# Patient Record
(demographics unavailable — no encounter records)

---

## 2025-04-09 NOTE — ASSESSMENT
[FreeTextEntry1] : We reviewed routine infectious criterion for tonsillectomy. We are normally looking for 7 episodes in one year, 5 episodes in two consecutive years, or 3 episodes in each of three years (known as the [place] criteria). We do sometimes modify these if there is overlap with PHILIPPE or a syndromic presentation. The patient has not yet met these criteria, but they will be precise with event documentation in the future. Return to discuss possible tonsillectomy if recurrent strep infections.   Positive depression screening today. Denies wanting to hurt self or others. Resources provided. Reached out to primary PCP Dr. Bradley to inform her of positive screen today.

## 2025-04-09 NOTE — REVIEW OF SYSTEMS
[Negative] : Heme/Lymph [de-identified] : as per HPI [de-identified] : as per HPI [de-identified] : as per HPI

## 2025-04-09 NOTE — REASON FOR VISIT
[Initial Evaluation] : an initial evaluation for [Throat Infections] : throat infections [Patient] : patient [Mother] : mother

## 2025-04-09 NOTE — PHYSICAL EXAM
[2+] : 2+ [Normal Gait and Station] : normal gait and station [Normal muscle strength, symmetry and tone of facial, head and neck musculature] : normal muscle strength, symmetry and tone of facial, head and neck musculature [Normal] : no cervical lymphadenopathy [Exposed Vessel] : left anterior vessel not exposed [Increased Work of Breathing] : no increased work of breathing with use of accessory muscles and retractions [de-identified] : no oral ulcers

## 2025-04-09 NOTE — HISTORY OF PRESENT ILLNESS
[No Personal or Family History of Easy Bruising, Bleeding, or Issues with General Anesthesia] : No Personal or Family History of easy bruising, bleeding, or issues with general anesthesia [de-identified] : Sarmad is a 15 year old boy here for evaluation of possible tonsil ulcer and recurrent strep throat infections.  Here with mom who helps provide history.   Recently with strep throat - evaluated at urgent care.  Was having throat pain and difficulty swallowing.  Noted to have small ulcer on tonsil and referred for ENT evaluation.  Strep resolved with antibiotics. No longer painful and no longer ulcer.  Does get frequent strep throat infections over the past couple of years.  Mom unsure exact number - always going to urgent care.  At least 4 infections this year. Likely 4 in the previous year.  Sibling with history of tonsillectomy for recurrent strep.   Seasonal allergies.  Uses antihistamines during the spring.  Not using nasal sprays.  Not evaluated by an allergist recently.   Screened positive on depression screening today.  Endorses anxiety recently and recent anxiety attack during sports.  Open to talking to someone.  Denies thoughts of hurting self or others.   No hearing or speech concerns.  No other otolaryngology concerns.

## 2025-04-09 NOTE — CONSULT LETTER
[Dear  ___] : Dear  [unfilled], [Courtesy Letter:] : I had the pleasure of seeing your patient, [unfilled], in my office today. [Please see my note below.] : Please see my note below. [Sincerely,] : Sincerely, [FreeTextEntry3] : Sailaja Diamond M.D. [place] [address] Tel [phone] Fax [phone]